# Patient Record
Sex: MALE | Race: WHITE | Employment: FULL TIME | URBAN - METROPOLITAN AREA
[De-identification: names, ages, dates, MRNs, and addresses within clinical notes are randomized per-mention and may not be internally consistent; named-entity substitution may affect disease eponyms.]

---

## 2017-04-26 DIAGNOSIS — E78.5 HYPERLIPIDEMIA LDL GOAL <100: ICD-10-CM

## 2017-04-26 RX ORDER — ATORVASTATIN CALCIUM 80 MG/1
80 TABLET, FILM COATED ORAL AT BEDTIME
Qty: 90 TABLET | Refills: 0 | Status: SHIPPED | OUTPATIENT
Start: 2017-04-26 | End: 2017-05-23

## 2017-04-26 NOTE — TELEPHONE ENCOUNTER
LOV 2/17/16. No future OV scheduled. RN refilled rx for dispense #90 with no refills to allow patient sufficient amount of medication to schedule apt with Dr. Perez for further refills.

## 2017-05-16 ENCOUNTER — TELEPHONE (OUTPATIENT)
Dept: CARDIOLOGY | Facility: CLINIC | Age: 47
End: 2017-05-16

## 2017-05-16 NOTE — TELEPHONE ENCOUNTER
----- Message from Alondra Perez, RN sent at 5/16/2017 11:00 AM CDT -----  Regarding: Fasting labs  Gurdeep Robbins and Essence,  Please place orders for Lipid/ALT/BMP  Dx: CAD/HLD/HTN  Call pt to schedule prior to 5/23/17 revisit.  Thanks,  Fadumo

## 2017-05-17 DIAGNOSIS — E78.5 HYPERLIPIDEMIA LDL GOAL <70: Primary | Chronic | ICD-10-CM

## 2017-05-17 DIAGNOSIS — E78.5 HYPERLIPIDEMIA LDL GOAL <70: Chronic | ICD-10-CM

## 2017-05-17 DIAGNOSIS — I25.10 CORONARY ARTERY DISEASE INVOLVING NATIVE CORONARY ARTERY OF NATIVE HEART WITHOUT ANGINA PECTORIS: Chronic | ICD-10-CM

## 2017-05-17 LAB
ALT SERPL W P-5'-P-CCNC: 47 U/L (ref 0–70)
ANION GAP SERPL CALCULATED.3IONS-SCNC: 9 MMOL/L (ref 3–14)
BUN SERPL-MCNC: 12 MG/DL (ref 7–30)
CALCIUM SERPL-MCNC: 8.9 MG/DL (ref 8.5–10.1)
CHLORIDE SERPL-SCNC: 105 MMOL/L (ref 94–109)
CHOLEST SERPL-MCNC: 126 MG/DL
CO2 SERPL-SCNC: 28 MMOL/L (ref 20–32)
CREAT SERPL-MCNC: 1.13 MG/DL (ref 0.66–1.25)
GFR SERPL CREATININE-BSD FRML MDRD: 69 ML/MIN/1.7M2
GLUCOSE SERPL-MCNC: 94 MG/DL (ref 70–99)
HDLC SERPL-MCNC: 31 MG/DL
LDLC SERPL CALC-MCNC: 56 MG/DL
NONHDLC SERPL-MCNC: 95 MG/DL
POTASSIUM SERPL-SCNC: 3.8 MMOL/L (ref 3.4–5.3)
SODIUM SERPL-SCNC: 142 MMOL/L (ref 133–144)
TRIGL SERPL-MCNC: 195 MG/DL

## 2017-05-17 PROCEDURE — 80048 BASIC METABOLIC PNL TOTAL CA: CPT | Performed by: INTERNAL MEDICINE

## 2017-05-17 PROCEDURE — 80061 LIPID PANEL: CPT | Performed by: INTERNAL MEDICINE

## 2017-05-17 PROCEDURE — 84460 ALANINE AMINO (ALT) (SGPT): CPT | Performed by: INTERNAL MEDICINE

## 2017-05-17 PROCEDURE — 36415 COLL VENOUS BLD VENIPUNCTURE: CPT | Performed by: INTERNAL MEDICINE

## 2017-05-23 ENCOUNTER — OFFICE VISIT (OUTPATIENT)
Dept: CARDIOLOGY | Facility: CLINIC | Age: 47
End: 2017-05-23
Payer: COMMERCIAL

## 2017-05-23 VITALS
WEIGHT: 226 LBS | SYSTOLIC BLOOD PRESSURE: 119 MMHG | OXYGEN SATURATION: 96 % | BODY MASS INDEX: 33.37 KG/M2 | HEART RATE: 60 BPM | DIASTOLIC BLOOD PRESSURE: 79 MMHG

## 2017-05-23 DIAGNOSIS — I10 HYPERTENSION GOAL BP (BLOOD PRESSURE) < 140/90: ICD-10-CM

## 2017-05-23 DIAGNOSIS — I25.10 CORONARY ARTERY DISEASE INVOLVING NATIVE CORONARY ARTERY OF NATIVE HEART WITHOUT ANGINA PECTORIS: Primary | Chronic | ICD-10-CM

## 2017-05-23 DIAGNOSIS — E78.5 HYPERLIPIDEMIA LDL GOAL <70: Chronic | ICD-10-CM

## 2017-05-23 DIAGNOSIS — E78.5 HYPERLIPIDEMIA LDL GOAL <100: ICD-10-CM

## 2017-05-23 PROBLEM — F98.8 ADD (ATTENTION DEFICIT DISORDER): Status: ACTIVE | Noted: 2017-05-23

## 2017-05-23 PROCEDURE — 99214 OFFICE O/P EST MOD 30 MIN: CPT | Performed by: INTERNAL MEDICINE

## 2017-05-23 RX ORDER — ATORVASTATIN CALCIUM 80 MG/1
80 TABLET, FILM COATED ORAL AT BEDTIME
Qty: 90 TABLET | Refills: 3 | Status: SHIPPED | OUTPATIENT
Start: 2017-05-23 | End: 2017-05-23

## 2017-05-23 RX ORDER — METOPROLOL SUCCINATE 50 MG/1
50 TABLET, EXTENDED RELEASE ORAL DAILY
Qty: 90 TABLET | Refills: 3 | Status: SHIPPED | OUTPATIENT
Start: 2017-05-23 | End: 2017-05-23

## 2017-05-23 RX ORDER — ATORVASTATIN CALCIUM 80 MG/1
80 TABLET, FILM COATED ORAL AT BEDTIME
Qty: 90 TABLET | Refills: 3 | Status: SHIPPED | OUTPATIENT
Start: 2017-05-23 | End: 2017-05-24

## 2017-05-23 RX ORDER — METOPROLOL SUCCINATE 50 MG/1
50 TABLET, EXTENDED RELEASE ORAL DAILY
Qty: 90 TABLET | Refills: 3 | Status: SHIPPED | OUTPATIENT
Start: 2017-05-23 | End: 2017-05-24

## 2017-05-23 NOTE — LETTER
5/23/2017    Lima Meza, DO  [address] [place] [address]    RE: Lamonte Adamdeb       Dear Colleague,    I had the pleasure of seeing Lamonte Sarkar in the [facility].    Mr. Sarkar is a 47-year-old gentleman with a history of coronary artery disease, status post PCI to the LAD in 2007 following a presentation for an anterior myocardial infarction.  He has been left with a small apical scar but preserved systolic function.  He returns in 15-month followup after being late for his annual followup.  He needs to refill his cardiac medications.      The patient has been feeling well from a cardiovascular standpoint, denies any chest pain, pressure, shortness of breath, orthopnea or paroxysmal nocturnal dyspnea.      He was recently diagnosed with ADHD, and it was recommended that the patient be placed on drugs related to his ADHD.  For unclear reasons, the doctor that made the diagnosis recommended that the patient request the drugs from his primary care doctor or cardiologist.      His most recent lipids were from this month and showed a total cholesterol of 126, HDL 31, LDL 56 and triglycerides of 195.      Please see my separate note with his full physical examination.     Outpatient Encounter Prescriptions as of 5/23/2017   Medication Sig Dispense Refill     [DISCONTINUED] atorvastatin (LIPITOR) 80 MG tablet Take 1 tablet (80 mg) by mouth At Bedtime 90 tablet 3     [DISCONTINUED] metoprolol (TOPROL-XL) 50 MG 24 hr tablet Take 1 tablet (50 mg) by mouth daily 90 tablet 3     nitroglycerin (NITROSTAT) 0.4 MG SL tablet Place 1 tablet (0.4 mg) under the tongue every 5 minutes as needed for chest pain (if more than 1 is used, seek immediate medical attention) for chest pain 25 tablet 3     order for DME CPAP 10 cm       Coenzyme Q10 (CO Q 10) 100 MG CAPS Take 200 mg by mouth       aspirin EC 81 MG tablet Take 1 tablet (81 mg) by mouth daily 30 tablet 6     FISH OIL         [DISCONTINUED] atorvastatin (LIPITOR) 80 MG tablet Take 1 tablet (80 mg) by mouth At Bedtime 90 tablet 3     [DISCONTINUED] metoprolol (TOPROL-XL) 50 MG 24 hr tablet Take 1 tablet (50 mg) by mouth daily 90 tablet 3     [DISCONTINUED] atorvastatin (LIPITOR) 80 MG tablet Take 1 tablet (80 mg) by mouth At Bedtime 90 tablet 0     [DISCONTINUED] metoprolol (TOPROL-XL) 50 MG 24 hr tablet Take 1 tablet (50 mg) by mouth daily 90 tablet 3     No facility-administered encounter medications on file as of 5/23/2017.       IMPRESSION AND PLAN:   1.  Coronary artery disease -- Mr. Sarkar is clinically stable with no anginal symptoms at the present time.  I would recommend aspirin 81 mg a day, atorvastatin 80 mg daily and metoprolol unchanged at the present time for secondary prevention.  When we went to arrange followup for the patient, he was somewhat upset at our inability to prescribe ADHD drugs for him.  I instructed the patient that I could not prescribe his ADHD drugs, as that falls out of my purview of type of practice.  I would be happy to comment on the relative stability of his coronary disease but again would have to defer to his primary doctor or psychiatrist the actual prescribing of his ADHD drugs.  The patient, therefore, related to me that he is likely going to seek a primary doctor and cardiologist through a different system.  I have renewed his medications for 1 year, which should enable him plenty of time to find a new cardiologist.  Again, from a cardiovascular standpoint, he is completely stable and at this point will follow up with us on a p.r.n. basis.     Again, thank you for allowing me to participate in the care of your patient.      Sincerely,    Mahesh Perez MD     [facility]

## 2017-05-23 NOTE — PROGRESS NOTES
HPI and Plan:   See dictation    No orders of the defined types were placed in this encounter.      Orders Placed This Encounter   Medications     atorvastatin (LIPITOR) 80 MG tablet     Sig: Take 1 tablet (80 mg) by mouth At Bedtime     Dispense:  90 tablet     Refill:  3     metoprolol (TOPROL-XL) 50 MG 24 hr tablet     Sig: Take 1 tablet (50 mg) by mouth daily     Dispense:  90 tablet     Refill:  3       Medications Discontinued During This Encounter   Medication Reason     atorvastatin (LIPITOR) 80 MG tablet Reorder     metoprolol (TOPROL-XL) 50 MG 24 hr tablet Reorder         Encounter Diagnoses   Name Primary?     Coronary artery disease involving native coronary artery of native heart without angina pectoris Yes     Hyperlipidemia LDL goal <70      Hyperlipidemia LDL goal <100      Hypertension goal BP (blood pressure) < 140/90        CURRENT MEDICATIONS:  Current Outpatient Prescriptions   Medication Sig Dispense Refill     atorvastatin (LIPITOR) 80 MG tablet Take 1 tablet (80 mg) by mouth At Bedtime 90 tablet 3     metoprolol (TOPROL-XL) 50 MG 24 hr tablet Take 1 tablet (50 mg) by mouth daily 90 tablet 3     nitroglycerin (NITROSTAT) 0.4 MG SL tablet Place 1 tablet (0.4 mg) under the tongue every 5 minutes as needed for chest pain (if more than 1 is used, seek immediate medical attention) for chest pain 25 tablet 3     order for DME CPAP 10 cm       Coenzyme Q10 (CO Q 10) 100 MG CAPS Take 200 mg by mouth       aspirin EC 81 MG tablet Take 1 tablet (81 mg) by mouth daily 30 tablet 6     FISH OIL        [DISCONTINUED] atorvastatin (LIPITOR) 80 MG tablet Take 1 tablet (80 mg) by mouth At Bedtime 90 tablet 0     [DISCONTINUED] metoprolol (TOPROL-XL) 50 MG 24 hr tablet Take 1 tablet (50 mg) by mouth daily 90 tablet 3       ALLERGIES   No Known Allergies    PAST MEDICAL HISTORY:  Past Medical History:   Diagnosis Date     Myocardial infarction (H) 2007    with occluded LAD lesion       PAST SURGICAL  HISTORY:  Past Surgical History:   Procedure Laterality Date     SURGICAL HISTORY OF -   1980    Dental surgery - no complications on tooth extraction x3     SURGICAL HISTORY OF -   2004    RIGHT achilles tendon repair       FAMILY HISTORY:  Family History   Problem Relation Age of Onset     CANCER Father      unknown type     Breast Cancer Mother      age of onset:  65     CANCER Mother      lung cancer     Respiratory Mother      asthma     DIABETES Maternal Grandmother      DIABETES Maternal Grandfather      DIABETES Paternal Grandfather      CEREBROVASCULAR DISEASE Paternal Grandfather      Asthma No family hx of      Hypertension No family hx of      Cancer - colorectal Father 55     Prostate Cancer No family hx of      HEART DISEASE Other      HEART DISEASE Other        SOCIAL HISTORY:  Social History     Social History     Marital status:      Spouse name: N/A     Number of children: N/A     Years of education: N/A     Occupational History           Social History Main Topics     Smoking status: Former Smoker     Years: 2.00     Quit date: 5/19/1986     Smokeless tobacco: None     Alcohol use 0.0 - 0.6 oz/week     0 - 1 Standard drinks or equivalent per week      Comment: on occassion     Drug use: No     Sexual activity: Yes     Partners: Female     Other Topics Concern     Parent/Sibling W/ Cabg, Mi Or Angioplasty Before 65f 55m? No     Social History Narrative       Review of Systems:  Skin:  Negative       Eyes:  Positive for glasses    ENT:  Negative      Respiratory:  Negative for dyspnea on exertion;shortness of breath     Cardiovascular:  Negative for;palpitations;chest pain;edema;syncope or near-syncope;fatigue;lightheadedness;dizziness      Gastroenterology: Positive for heartburn    Genitourinary:  Negative      Musculoskeletal:  Positive for joint stiffness    Neurologic:  Negative      Psychiatric:  Positive for anxiety;depression    Heme/Lymph/Imm:  Negative      Endocrine:   Negative        Physical Exam:  Vitals: /79  Pulse 60  Wt 102.5 kg (226 lb)  SpO2 96%  BMI 33.37 kg/m2    Constitutional:  in no acute distress        Skin:  warm and dry to the touch        Head:  normocephalic        Eyes:  sclera white        ENT:  no pallor or cyanosis        Neck:  no stiffness        Chest:  clear to auscultation          Cardiac: regular rhythm;normal S1 and S2                  Abdomen:  abdomen soft        Vascular:                                     Radial pulses are equal and normal    Extremities and Back:  no edema              Neurological:  affect appropriate              CC  No referring provider defined for this encounter.

## 2017-05-23 NOTE — MR AVS SNAPSHOT
After Visit Summary   5/23/2017    Lamonte Sarkar    MRN: [id number]           Patient Information     Date Of Birth          1970        Visit Information        Provider Department      5/23/2017 10:30 AM Mahesh Perez MD [facility] AT [facility]        Today's Diagnoses     Coronary artery disease involving native coronary artery of native heart without angina pectoris    -  1    Hyperlipidemia LDL goal <70        Hyperlipidemia LDL goal <100        Hypertension goal BP (blood pressure) < 140/90           Follow-ups after your visit        Additional Services     Follow-Up with Cardiologist                 Future tests that were ordered for you today     Open Future Orders        Priority Expected Expires Ordered    Follow-Up with Cardiologist Routine 5/23/2019 6/12/2019 5/23/2017            Who to contact     If you have questions or need follow up information about today's clinic visit or your schedule please contact [facility] AT [facility] directly at [phone].  Normal or non-critical lab and imaging results will be communicated to you by [facility], letter or phone within 4 business days after the clinic has received the results. If you do not hear from us within 7 days, please contact the clinic through [facility] or phone. If you have a critical or abnormal lab result, we will notify you by phone as soon as possible.  Submit refill requests through [employer] or call your pharmacy and they will forward the refill request to us. Please allow 3 business days for your refill to be completed.          Additional Information About Your Visit        [facility] Information     [employer] gives you secure access to your electronic health record. If you see a primary care provider, you can also send messages to your care team and make appointments. If you have questions, please call your primary care clinic.  If you do not have a primary  care provider, please call [phone] and they will assist you.        Care EveryWhere ID     This is your Care EveryWhere ID. This could be used by other organizations to access your [place] medical records  [id number]        Your Vitals Were     Pulse Pulse Oximetry BMI (Body Mass Index)             60 96% 33.37 kg/m2          Blood Pressure from Last 3 Encounters:   05/23/17 119/79   05/19/16 134/79   05/09/16 120/76    Weight from Last 3 Encounters:   05/23/17 102.5 kg (226 lb)   05/19/16 96.6 kg (213 lb)   05/09/16 96.1 kg (211 lb 12.8 oz)                 Where to get your medicines      Some of these will need a paper prescription and others can be bought over the counter.  Ask your nurse if you have questions.     Bring a paper prescription for each of these medications     atorvastatin 80 MG tablet    metoprolol 50 MG 24 hr tablet          Primary Care Provider Office Phone # Fax #    Lima Essence Meza [phone]       [address]   [facility] [address]        Thank you!     Thank you for choosing [facility] AT [facility]  for your care. Our goal is always to provide you with excellent care. Hearing back from our patients is one way we can continue to improve our services. Please take a few minutes to complete the written survey that you may receive in the mail after your visit with us. Thank you!             Your Updated Medication List - Protect others around you: Learn how to safely use, store and throw away your medicines at www.disposemymeds.org.          This list is accurate as of: 5/23/17 10:43 AM.  Always use your most recent med list.                   Brand Name Dispense Instructions for use    aspirin EC 81 MG EC tablet     30 tablet    Take 1 tablet (81 mg) by mouth daily       atorvastatin 80 MG tablet    LIPITOR    90 tablet    Take 1 tablet (80 mg) by mouth At Bedtime       Co Q 10 100 MG Caps      Take 200 mg by  mouth       FISH OIL          metoprolol 50 MG 24 hr tablet    TOPROL-XL    90 tablet    Take 1 tablet (50 mg) by mouth daily       nitroglycerin 0.4 MG sublingual tablet    NITROSTAT    25 tablet    Place 1 tablet (0.4 mg) under the tongue every 5 minutes as needed for chest pain (if more than 1 is used, seek immediate medical attention) for chest pain       order for DME      CPAP 10 cm

## 2017-05-23 NOTE — PROGRESS NOTES
Patient states is a strict vegetarian no dairy or meet, strictly fruits vegetables, nuts and grains.  New diagnosis of ADD [facility], Dr. Son does not want to prescribe medication due to current heart condiotions would like primary or cardiologist to make the decision.

## 2017-05-24 DIAGNOSIS — E78.5 HYPERLIPIDEMIA LDL GOAL <100: ICD-10-CM

## 2017-05-24 DIAGNOSIS — I25.10 CORONARY ARTERY DISEASE INVOLVING NATIVE CORONARY ARTERY OF NATIVE HEART WITHOUT ANGINA PECTORIS: Chronic | ICD-10-CM

## 2017-05-24 DIAGNOSIS — I10 HYPERTENSION GOAL BP (BLOOD PRESSURE) < 140/90: ICD-10-CM

## 2017-05-24 RX ORDER — ATORVASTATIN CALCIUM 80 MG/1
80 TABLET, FILM COATED ORAL AT BEDTIME
Qty: 90 TABLET | Refills: 3 | Status: SHIPPED | OUTPATIENT
Start: 2017-05-24

## 2017-05-24 RX ORDER — METOPROLOL SUCCINATE 50 MG/1
50 TABLET, EXTENDED RELEASE ORAL DAILY
Qty: 90 TABLET | Refills: 3 | Status: SHIPPED | OUTPATIENT
Start: 2017-05-24

## 2017-05-24 NOTE — PROGRESS NOTES
HISTORY OF PRESENT ILLNESS:  Mr. Sarkar is a 47-year-old gentleman with a history of coronary artery disease, status post PCI to the LAD in 2007 following a presentation for an anterior myocardial infarction.  He has been left with a small apical scar but preserved systolic function.  He returns in 15-month followup after being late for his annual followup.  He needs to refill his cardiac medications.      The patient has been feeling well from a cardiovascular standpoint, denies any chest pain, pressure, shortness of breath, orthopnea or paroxysmal nocturnal dyspnea.      He was recently diagnosed with ADHD, and it was recommended that the patient be placed on drugs related to his ADHD.  For unclear reasons, the doctor that made the diagnosis recommended that the patient request the drugs from his primary care doctor or cardiologist.      His most recent lipids were from this month and showed a total cholesterol of 126, HDL 31, LDL 56 and triglycerides of 195.      Please see my separate note with his full physical examination.      IMPRESSION AND PLAN:   1.  Coronary artery disease -- Mr. Sarkar is clinically stable with no anginal symptoms at the present time.  I would recommend aspirin 81 mg a day, atorvastatin 80 mg daily and metoprolol unchanged at the present time for secondary prevention.  When we went to arrange followup for the patient, he was somewhat upset at our inability to prescribe ADHD drugs for him.  I instructed the patient that I could not prescribe his ADHD drugs, as that falls out of my purview of type of practice.  I would be happy to comment on the relative stability of his coronary disease but again would have to defer to his primary doctor or psychiatrist the actual prescribing of his ADHD drugs.  The patient, therefore, related to me that he is likely going to seek a primary doctor and cardiologist through a different system.  I have renewed his medications for 1 year, which should  enable him plenty of time to find a new cardiologist.  Again, from a cardiovascular standpoint, he is completely stable and at this point will follow up with us on a p.r.n. basis.         JIM PARKINSON MD             D: 2017 10:51   T: 2017 22:14   MT: SHAE      Name:     INÉS ARIZA   MRN:      [id number]        Account:      [id number]   :      1970           Service Date: 2017      Document: [id number]

## 2017-05-26 DIAGNOSIS — G47.33 OBSTRUCTIVE SLEEP APNEA: Primary | Chronic | ICD-10-CM

## 2018-09-18 ENCOUNTER — TELEPHONE (OUTPATIENT)
Dept: OTHER | Facility: CLINIC | Age: 48
End: 2018-09-18

## 2019-11-06 ENCOUNTER — HEALTH MAINTENANCE LETTER (OUTPATIENT)
Age: 49
End: 2019-11-06

## 2020-11-29 ENCOUNTER — HEALTH MAINTENANCE LETTER (OUTPATIENT)
Age: 50
End: 2020-11-29

## 2021-04-23 ENCOUNTER — IMMUNIZATION (OUTPATIENT)
Dept: FAMILY MEDICINE | Facility: CLINIC | Age: 51
End: 2021-04-23
Payer: OTHER GOVERNMENT

## 2021-04-23 PROCEDURE — 91301 PR COVID VAC MODERNA 100 MCG/0.5 ML IM: CPT

## 2021-04-23 PROCEDURE — 0011A PR COVID VAC MODERNA 100 MCG/0.5 ML IM: CPT

## 2021-05-21 ENCOUNTER — IMMUNIZATION (OUTPATIENT)
Dept: FAMILY MEDICINE | Facility: CLINIC | Age: 51
End: 2021-05-21
Attending: FAMILY MEDICINE
Payer: OTHER GOVERNMENT

## 2021-05-21 PROCEDURE — 91301 PR COVID VAC MODERNA 100 MCG/0.5 ML IM: CPT

## 2021-05-21 PROCEDURE — 0012A PR COVID VAC MODERNA 100 MCG/0.5 ML IM: CPT

## 2021-09-19 ENCOUNTER — HEALTH MAINTENANCE LETTER (OUTPATIENT)
Age: 51
End: 2021-09-19

## 2021-10-19 ENCOUNTER — APPOINTMENT (OUTPATIENT)
Dept: URBAN - METROPOLITAN AREA CLINIC 252 | Age: 51
Setting detail: DERMATOLOGY
End: 2021-10-19

## 2021-10-19 VITALS — RESPIRATION RATE: 18 BRPM | WEIGHT: 187 LBS | HEIGHT: 68 IN

## 2021-10-19 DIAGNOSIS — L0293 CARBUNCLE AND FURUNCLE OF UNSPECIFIED SITE: ICD-10-CM

## 2021-10-19 DIAGNOSIS — L0292 CARBUNCLE AND FURUNCLE OF UNSPECIFIED SITE: ICD-10-CM

## 2021-10-19 PROBLEM — L02.423 FURUNCLE OF RIGHT UPPER LIMB: Status: ACTIVE | Noted: 2021-10-19

## 2021-10-19 PROCEDURE — OTHER INTRALESIONAL KENALOG: OTHER

## 2021-10-19 PROCEDURE — OTHER COUNSELING: OTHER

## 2021-10-19 PROCEDURE — 99202 OFFICE O/P NEW SF 15 MIN: CPT | Mod: 25

## 2021-10-19 ASSESSMENT — LOCATION DETAILED DESCRIPTION DERM: LOCATION DETAILED: RIGHT ANTERIOR DISTAL UPPER ARM

## 2021-10-19 ASSESSMENT — LOCATION SIMPLE DESCRIPTION DERM: LOCATION SIMPLE: RIGHT UPPER ARM

## 2021-10-19 ASSESSMENT — LOCATION ZONE DERM: LOCATION ZONE: ARM

## 2021-10-19 NOTE — PROCEDURE: COUNSELING
Patient Specific Counseling (Will Not Stick From Patient To Patient): -Discussed the suspected diagnosis of a resolving infected hair follicle called a furuncle; we can biopsy the lesion to confirm the diagnosis today, however there will be scar post biopsy and may not be necessary. Skin cancer such as basal cell is always within the differential therefore if it changes in appearance or worsens, he should RTC we should reevaluate. \\n-Patient inquired about risks for mupirocin causing cancer, reassured that skin cancer is not on the warning label, this topical is benign and safe to use. Today it does not appear inflected, therefore a topical isn’t necessary at this point. \\n-allow time for the redness to fade, can take a months time or so. Recommend taking photos to monitor progress. \\n-Offered the option of a kenalog iL injection as that can expedite the progress of healing. Patient expressing interest in this option
Detail Level: Detailed

## 2021-11-14 ENCOUNTER — HEALTH MAINTENANCE LETTER (OUTPATIENT)
Age: 51
End: 2021-11-14

## 2022-01-09 ENCOUNTER — HEALTH MAINTENANCE LETTER (OUTPATIENT)
Age: 52
End: 2022-01-09

## 2022-11-21 ENCOUNTER — HEALTH MAINTENANCE LETTER (OUTPATIENT)
Age: 52
End: 2022-11-21

## 2023-04-16 ENCOUNTER — HEALTH MAINTENANCE LETTER (OUTPATIENT)
Age: 53
End: 2023-04-16

## 2024-06-23 ENCOUNTER — HEALTH MAINTENANCE LETTER (OUTPATIENT)
Age: 54
End: 2024-06-23

## 2025-01-02 SDOH — HEALTH STABILITY: PHYSICAL HEALTH: ON AVERAGE, HOW MANY DAYS PER WEEK DO YOU ENGAGE IN MODERATE TO STRENUOUS EXERCISE (LIKE A BRISK WALK)?: 6 DAYS

## 2025-01-02 SDOH — HEALTH STABILITY: PHYSICAL HEALTH: ON AVERAGE, HOW MANY MINUTES DO YOU ENGAGE IN EXERCISE AT THIS LEVEL?: 60 MIN

## 2025-01-02 ASSESSMENT — SOCIAL DETERMINANTS OF HEALTH (SDOH): HOW OFTEN DO YOU GET TOGETHER WITH FRIENDS OR RELATIVES?: PATIENT DECLINED

## 2025-01-07 ENCOUNTER — OFFICE VISIT (OUTPATIENT)
Dept: FAMILY MEDICINE | Facility: CLINIC | Age: 55
End: 2025-01-07
Payer: COMMERCIAL

## 2025-01-07 VITALS
WEIGHT: 159.2 LBS | TEMPERATURE: 97.7 F | HEART RATE: 71 BPM | OXYGEN SATURATION: 99 % | BODY MASS INDEX: 23.58 KG/M2 | DIASTOLIC BLOOD PRESSURE: 80 MMHG | HEIGHT: 69 IN | SYSTOLIC BLOOD PRESSURE: 122 MMHG | RESPIRATION RATE: 20 BRPM

## 2025-01-07 DIAGNOSIS — Z13.1 SCREENING FOR DIABETES MELLITUS: ICD-10-CM

## 2025-01-07 DIAGNOSIS — Z13.29 SCREENING FOR THYROID DISORDER: ICD-10-CM

## 2025-01-07 DIAGNOSIS — Z12.11 SPECIAL SCREENING FOR MALIGNANT NEOPLASMS, COLON: ICD-10-CM

## 2025-01-07 DIAGNOSIS — Z13.220 SCREENING CHOLESTEROL LEVEL: ICD-10-CM

## 2025-01-07 DIAGNOSIS — Z00.00 ROUTINE GENERAL MEDICAL EXAMINATION AT A HEALTH CARE FACILITY: Primary | ICD-10-CM

## 2025-01-07 DIAGNOSIS — Z11.4 SCREENING FOR HIV (HUMAN IMMUNODEFICIENCY VIRUS): ICD-10-CM

## 2025-01-07 DIAGNOSIS — Z80.0 FAMILY HISTORY OF COLON CANCER: ICD-10-CM

## 2025-01-07 DIAGNOSIS — R19.5 POSITIVE COLORECTAL CANCER SCREENING USING COLOGUARD TEST: ICD-10-CM

## 2025-01-07 DIAGNOSIS — Z11.59 NEED FOR HEPATITIS C SCREENING TEST: ICD-10-CM

## 2025-01-07 DIAGNOSIS — Z78.9 KETOGENIC DIET: ICD-10-CM

## 2025-01-07 DIAGNOSIS — Z12.5 SCREENING FOR PROSTATE CANCER: ICD-10-CM

## 2025-01-07 PROCEDURE — 87389 HIV-1 AG W/HIV-1&-2 AB AG IA: CPT | Performed by: NURSE PRACTITIONER

## 2025-01-07 PROCEDURE — 80061 LIPID PANEL: CPT | Performed by: NURSE PRACTITIONER

## 2025-01-07 PROCEDURE — 99386 PREV VISIT NEW AGE 40-64: CPT | Performed by: NURSE PRACTITIONER

## 2025-01-07 PROCEDURE — 36415 COLL VENOUS BLD VENIPUNCTURE: CPT | Performed by: NURSE PRACTITIONER

## 2025-01-07 PROCEDURE — 84443 ASSAY THYROID STIM HORMONE: CPT | Performed by: NURSE PRACTITIONER

## 2025-01-07 PROCEDURE — 86803 HEPATITIS C AB TEST: CPT | Performed by: NURSE PRACTITIONER

## 2025-01-07 PROCEDURE — G0103 PSA SCREENING: HCPCS | Performed by: NURSE PRACTITIONER

## 2025-01-07 PROCEDURE — 82947 ASSAY GLUCOSE BLOOD QUANT: CPT | Performed by: NURSE PRACTITIONER

## 2025-01-07 NOTE — PROGRESS NOTES
Preventive Care Visit  [facility] GURPREET GUERRA, Family Medicine  Jan 7, 2025      Assessment & Plan     Routine general medical examination at a health care facility      Screening for diabetes mellitus    - Glucose; Future  - Glucose    Screening for prostate cancer    - PSA, screen; Future  - PSA, screen    Screening for thyroid disorder    - TSH with free T4 reflex; Future  - TSH with free T4 reflex    Special screening for malignant neoplasms, colon    - Colonoscopy Screening  Referral; Future    Positive FIT (fecal immunochemical test)      Family history of colon cancer    - Colonoscopy Screening  Referral; Future    Positive colorectal cancer screening using Cologuard test    - Colonoscopy Screening  Referral; Future    Screening cholesterol level    - Lipid panel reflex to direct LDL Non-fasting; Future  - Lipid panel reflex to direct LDL Non-fasting    Screening for HIV (human immunodeficiency virus)    - HIV Antigen Antibody Combo; Future  - HIV Antigen Antibody Combo    Need for hepatitis C screening test    - Hepatitis C Screen Reflex to HCV RNA Quant and Genotype; Future  - Hepatitis C Screen Reflex to HCV RNA Quant and Genotype    Ketogenic diet      Patient has been advised of split billing requirements and indicates understanding: Yes        Counseling  Appropriate preventive services were addressed with this patient via screening, questionnaire, or discussion as appropriate for fall prevention, nutrition, physical activity, Tobacco-use cessation, social engagement, weight loss and cognition.  Checklist reviewing preventive services available has been given to the patient.  Reviewed patient's diet, addressing concerns and/or questions.       Regular exercise  See Patient Instructions    Sondra   Lamonte is a 54 year old, presenting for the following:  Physical    He reports his mental health is good.  He is retired.  He lost  significant weight with ketogenic diet and walking and no longer needs blood pressure or cholesterol medication.  2 adult children, gets along well with wife.  Quit smoking over 20 years ago.  In agreement to screening labs.  Declining vaccines.  Would like colonoscopy ordered.  Did have positive Cologuard last year, family history of colon cancer.        1/7/2025    10:07 AM   Additional Questions   Roomed by CHUNG         1/7/2025    10:07 AM   Patient Reported Additional Medications   Patient reports taking the following new medications Medications missing from list-provider please review and add          HPI    Patient would still like to discuss the following concern(s):  1. Colonoscopy order            1/2/2025   General Health   How would you rate your overall physical health? Good   Feel stress (tense, anxious, or unable to sleep) Only a little   (!) STRESS CONCERN      1/2/2025   Nutrition   Three or more servings of calcium each day? (!) I DON'T KNOW   Diet: Carbohydrate counting    Other   If other, please elaborate: Low Sugar diet that includes Healthy fats and Protein.  Regular Intermittent fasting.   How many servings of fruit and vegetables per day? (!) 2-3   How many sweetened beverages each day? 0-1       Multiple values from one day are sorted in reverse-chronological order         1/2/2025   Exercise   Days per week of moderate/strenous exercise 6 days   Average minutes spent exercising at this level 60 min         1/2/2025   Social Factors   Frequency of gathering with friends or relatives Patient declined   Worry food won't last until get money to buy more No   Food not last or not have enough money for food? No   Do you have housing? (Housing is defined as stable permanent housing and does not include staying ouside in a car, in a tent, in an abandoned building, in an overnight shelter, or couch-surfing.) Yes   Are you worried about losing your housing? No   Lack of transportation? No   Unable to  get utilities (heat,electricity)? No         2025   Fall Risk   Fallen 2 or more times in the past year? No   Trouble with walking or balance? No          2025   Dental   Dentist two times every year? Yes         2025   TB Screening   Were you born outside of the [place]? No         Today's PHQ-2 Score:       2025    12:52 PM   PHQ-2 (  [employer])   Q1: Little interest or pleasure in doing things 0   Q2: Feeling down, depressed or hopeless 0   PHQ-2 Score 0    Q1: Little interest or pleasure in doing things Not at all   Q2: Feeling down, depressed or hopeless Not at all   PHQ-2 Score 0       Patient-reported           2025   Substance Use   Alcohol more than 3/day or more than 7/wk Not Applicable   Do you use any other substances recreationally? No     Social History     Tobacco Use    Smoking status: Former     Current packs/day: 0.00     Types: Cigarettes     Start date: 1984     Quit date: 1986     Years since quittin.6     Passive exposure: Past    Smokeless tobacco: Never    Tobacco comments:     Light snuff chewer when young   Vaping Use    Vaping status: Never Used   Substance Use Topics    Alcohol use: Not Currently     Alcohol/week: 0.0 - 1.0 standard drinks of alcohol     Comment: on occassion    Drug use: No             2025   One time HIV Screening   Previous HIV test? No         2025   STI Screening   New sexual partner(s) since last STI/HIV test? No   Last PSA:   PSA   Date Value Ref Range Status   2016 1.02 0 - 4 ug/L Final     ASCVD Risk   The ASCVD Risk score (Preston BILLS, et al., 2019) failed to calculate for the following reasons:    Cannot find a previous HDL lab    Cannot find a previous total cholesterol lab           Reviewed and updated as needed this visit by Provider   Tobacco  Allergies  Meds  Problems  Med Hx  Surg Hx  Fam Hx  Soc   Hx Sexual Activity          Past Medical History:   Diagnosis Date    Myocardial infarction (H)      with occluded LAD lesion     Past Surgical History:   Procedure Laterality Date    CARDIAC SURGERY  2007    Stents    SURGICAL HISTORY OF -   1980    Dental surgery - no complications on tooth extraction x3    SURGICAL HISTORY OF -   2004    RIGHT achilles tendon repair     Lab work is in process  Labs reviewed in EPIC  BP Readings from Last 3 Encounters:   25 122/80   17 119/79   16 134/79    Wt Readings from Last 3 Encounters:   25 72.2 kg (159 lb 3.2 oz)   17 102.5 kg (226 lb)   16 96.6 kg (213 lb)                  Patient Active Problem List   Diagnosis    Obstructive sleep apnea- severe (AHI 31)    CAD (coronary artery disease)    Family History of Colon Cancer father at 55    ADD (attention deficit disorder)    Positive colorectal cancer screening using Cologuard test    Ketogenic diet     Past Surgical History:   Procedure Laterality Date    CARDIAC SURGERY  2007    Stents    SURGICAL HISTORY OF -   1980    Dental surgery - no complications on tooth extraction x3    SURGICAL HISTORY OF -   2004    RIGHT achilles tendon repair       Social History     Tobacco Use    Smoking status: Former     Current packs/day: 0.00     Types: Cigarettes     Start date: 1984     Quit date: 1986     Years since quittin.6     Passive exposure: Past    Smokeless tobacco: Never    Tobacco comments:     Light snuff chewer when young   Substance Use Topics    Alcohol use: Not Currently     Alcohol/week: 0.0 - 1.0 standard drinks of alcohol     Comment: on occassion     Family History   Problem Relation Age of Onset    Heart Disease Other     Heart Disease Other     Cancer Father         unknown type    Cancer - colorectal Father 55    Breast Cancer Mother         age of onset:  65    Cancer Mother         lung cancer    Respiratory Mother         asthma    Diabetes Maternal Grandmother     Diabetes Maternal Grandfather     Diabetes Paternal  "Grandfather     Cerebrovascular Disease Paternal Grandfather     Asthma No family hx of     Hypertension No family hx of     Prostate Cancer No family hx of          Current Outpatient Medications   Medication Sig Dispense Refill    Coenzyme Q10 (CO Q 10) 100 MG CAPS Take 200 mg by mouth      FISH OIL       order for DME CPAP 10 cm       No Known Allergies  Recent Labs   Lab Test 05/17/17  1024   LDL 56   HDL 31*   TRIG 195*   ALT 47   CR 1.13   GFRESTIMATED 69   GFRESTBLACK 83   POTASSIUM 3.8          Review of Systems  CONSTITUTIONAL: NEGATIVE for fever, chills, change in weight  INTEGUMENTARY/SKIN: NEGATIVE for worrisome rashes, moles or lesions  EYES: NEGATIVE for vision changes or irritation  ENT/MOUTH: NEGATIVE for ear, mouth and throat problems  RESP: NEGATIVE for significant cough or SOB  BREAST: NEGATIVE for masses, tenderness or discharge  CV: NEGATIVE for chest pain, palpitations or peripheral edema  GI: NEGATIVE for nausea, abdominal pain, heartburn, or change in bowel habits  : NEGATIVE for frequency, dysuria, or hematuria  MUSCULOSKELETAL: NEGATIVE for significant arthralgias or myalgia  NEURO: NEGATIVE for weakness, dizziness or paresthesias  ENDOCRINE: NEGATIVE for temperature intolerance, skin/hair changes  HEME: NEGATIVE for bleeding problems  PSYCHIATRIC: NEGATIVE for changes in mood or affect     Objective    Exam  /80   Pulse 71   Temp 97.7  F (36.5  C) (Temporal)   Resp 20   Ht 1.753 m (5' 9\")   Wt 72.2 kg (159 lb 3.2 oz)   SpO2 99%   BMI 23.51 kg/m     Estimated body mass index is 23.51 kg/m  as calculated from the following:    Height as of this encounter: 1.753 m (5' 9\").    Weight as of this encounter: 72.2 kg (159 lb 3.2 oz).    Physical Exam  GENERAL: alert and no distress  EYES: Eyes grossly normal to inspection, PERRL and conjunctivae and sclerae normal  HENT: ear canals and TM's normal, nose and mouth without ulcers or lesions  NECK: no adenopathy, no asymmetry, masses, " or scars  RESP: lungs clear to auscultation - no rales, rhonchi or wheezes  CV: regular rate and rhythm, normal S1 S2, no S3 or S4, no murmur, click or rub, no peripheral edema  ABDOMEN: soft, nontender, no hepatosplenomegaly, no masses and bowel sounds normal   (male): Deferred per patient no concerns  MS: no gross musculoskeletal defects noted, no edema, no CVA tenderness  SKIN: no suspicious lesions or rashes  NEURO: Normal strength and tone, cranial nerves intact, mentation intact and speech normal  PSYCH: mentation appears normal, affect normal/bright  LYMPH: no cervical, supraclavicular  adenopathy        Signed Electronically by: GURPREET RUCKER

## 2025-01-07 NOTE — PATIENT INSTRUCTIONS
Patient Education   Preventive Care Advice   This is general advice given by our system to help you stay healthy. However, your care team may have specific advice just for you. Please talk to your care team about your preventive care needs.  Nutrition  Eat 5 or more servings of fruits and vegetables each day.  Try wheat bread, brown rice and whole grain pasta (instead of white bread, rice, and pasta).  Get enough calcium and vitamin D. Check the label on foods and aim for 100% of the RDA (recommended daily allowance).  Lifestyle  Exercise at least 150 minutes each week  (30 minutes a day, 5 days a week).  Do muscle strengthening activities 2 days a week. These help control your weight and prevent disease.  No smoking.  Wear sunscreen to prevent skin cancer.  Have a dental exam and cleaning every 6 months.  Yearly exams  See your health care team every year to talk about:  Any changes in your health.  Any medicines your care team has prescribed.  Preventive care, family planning, and ways to prevent chronic diseases.  Shots (vaccines)   HPV shots (up to age 26), if you've never had them before.  Hepatitis B shots (up to age 59), if you've never had them before.  COVID-19 shot: Get this shot when it's due.  Flu shot: Get a flu shot every year.  Tetanus shot: Get a tetanus shot every 10 years.  Pneumococcal, hepatitis A, and RSV shots: Ask your care team if you need these based on your risk.  Shingles shot (for age 50 and up)  General health tests  Diabetes screening:  Starting at age 35, Get screened for diabetes at least every 3 years.  If you are younger than age 35, ask your care team if you should be screened for diabetes.  Cholesterol test: At age 39, start having a cholesterol test every 5 years, or more often if advised.  Bone density scan (DEXA): At age 50, ask your care team if you should have this scan for osteoporosis (brittle bones).  Hepatitis C: Get tested at least once in your life.  STIs (sexually  transmitted infections)  Before age 24: Ask your care team if you should be screened for STIs.  After age 24: Get screened for STIs if you're at risk. You are at risk for STIs (including HIV) if:  You are sexually active with more than one person.  You don't use condoms every time.  You or a partner was diagnosed with a sexually transmitted infection.  If you are at risk for HIV, ask about PrEP medicine to prevent HIV.  Get tested for HIV at least once in your life, whether you are at risk for HIV or not.  Cancer screening tests  Cervical cancer screening: If you have a cervix, begin getting regular cervical cancer screening tests starting at age 21.  Breast cancer scan (mammogram): If you've ever had breasts, begin having regular mammograms starting at age 40. This is a scan to check for breast cancer.  Colon cancer screening: It is important to start screening for colon cancer at age 45.  Have a colonoscopy test every 10 years (or more often if you're at risk) Or, ask your provider about stool tests like a FIT test every year or Cologuard test every 3 years.  To learn more about your testing options, visit:   .  For help making a decision, visit:   https://bit.ly/vy92545.  Prostate cancer screening test: If you have a prostate, ask your care team if a prostate cancer screening test (PSA) at age 55 is right for you.  Lung cancer screening: If you are a current or former smoker ages 50 to 80, ask your care team if ongoing lung cancer screenings are right for you.  For informational purposes only. Not to replace the advice of your health care provider. Copyright   2023 [employer]. All rights reserved. Clinically reviewed by the [facility]. Mpayy [id number] - REV 01/24.

## 2025-01-07 NOTE — H&P (VIEW-ONLY)
Preventive Care Visit  [facility] GURPREET GUERRA, Family Medicine  Jan 7, 2025      Assessment & Plan     Routine general medical examination at a health care facility      Screening for diabetes mellitus    - Glucose; Future  - Glucose    Screening for prostate cancer    - PSA, screen; Future  - PSA, screen    Screening for thyroid disorder    - TSH with free T4 reflex; Future  - TSH with free T4 reflex    Special screening for malignant neoplasms, colon    - Colonoscopy Screening  Referral; Future    Positive FIT (fecal immunochemical test)      Family history of colon cancer    - Colonoscopy Screening  Referral; Future    Positive colorectal cancer screening using Cologuard test    - Colonoscopy Screening  Referral; Future    Screening cholesterol level    - Lipid panel reflex to direct LDL Non-fasting; Future  - Lipid panel reflex to direct LDL Non-fasting    Screening for HIV (human immunodeficiency virus)    - HIV Antigen Antibody Combo; Future  - HIV Antigen Antibody Combo    Need for hepatitis C screening test    - Hepatitis C Screen Reflex to HCV RNA Quant and Genotype; Future  - Hepatitis C Screen Reflex to HCV RNA Quant and Genotype    Ketogenic diet      Patient has been advised of split billing requirements and indicates understanding: Yes        Counseling  Appropriate preventive services were addressed with this patient via screening, questionnaire, or discussion as appropriate for fall prevention, nutrition, physical activity, Tobacco-use cessation, social engagement, weight loss and cognition.  Checklist reviewing preventive services available has been given to the patient.  Reviewed patient's diet, addressing concerns and/or questions.       Regular exercise  See Patient Instructions    Sondra   Lamonte is a 54 year old, presenting for the following:  Physical    He reports his mental health is good.  He is retired.  He lost  significant weight with ketogenic diet and walking and no longer needs blood pressure or cholesterol medication.  2 adult children, gets along well with wife.  Quit smoking over 20 years ago.  In agreement to screening labs.  Declining vaccines.  Would like colonoscopy ordered.  Did have positive Cologuard last year, family history of colon cancer.        1/7/2025    10:07 AM   Additional Questions   Roomed by CHUNG         1/7/2025    10:07 AM   Patient Reported Additional Medications   Patient reports taking the following new medications Medications missing from list-provider please review and add          HPI    Patient would still like to discuss the following concern(s):  1. Colonoscopy order            1/2/2025   General Health   How would you rate your overall physical health? Good   Feel stress (tense, anxious, or unable to sleep) Only a little   (!) STRESS CONCERN      1/2/2025   Nutrition   Three or more servings of calcium each day? (!) I DON'T KNOW   Diet: Carbohydrate counting    Other   If other, please elaborate: Low Sugar diet that includes Healthy fats and Protein.  Regular Intermittent fasting.   How many servings of fruit and vegetables per day? (!) 2-3   How many sweetened beverages each day? 0-1       Multiple values from one day are sorted in reverse-chronological order         1/2/2025   Exercise   Days per week of moderate/strenous exercise 6 days   Average minutes spent exercising at this level 60 min         1/2/2025   Social Factors   Frequency of gathering with friends or relatives Patient declined   Worry food won't last until get money to buy more No   Food not last or not have enough money for food? No   Do you have housing? (Housing is defined as stable permanent housing and does not include staying ouside in a car, in a tent, in an abandoned building, in an overnight shelter, or couch-surfing.) Yes   Are you worried about losing your housing? No   Lack of transportation? No   Unable to  get utilities (heat,electricity)? No         2025   Fall Risk   Fallen 2 or more times in the past year? No   Trouble with walking or balance? No          2025   Dental   Dentist two times every year? Yes         2025   TB Screening   Were you born outside of the [place]? No         Today's PHQ-2 Score:       2025    12:52 PM   PHQ-2 (  [employer])   Q1: Little interest or pleasure in doing things 0   Q2: Feeling down, depressed or hopeless 0   PHQ-2 Score 0    Q1: Little interest or pleasure in doing things Not at all   Q2: Feeling down, depressed or hopeless Not at all   PHQ-2 Score 0       Patient-reported           2025   Substance Use   Alcohol more than 3/day or more than 7/wk Not Applicable   Do you use any other substances recreationally? No     Social History     Tobacco Use    Smoking status: Former     Current packs/day: 0.00     Types: Cigarettes     Start date: 1984     Quit date: 1986     Years since quittin.6     Passive exposure: Past    Smokeless tobacco: Never    Tobacco comments:     Light snuff chewer when young   Vaping Use    Vaping status: Never Used   Substance Use Topics    Alcohol use: Not Currently     Alcohol/week: 0.0 - 1.0 standard drinks of alcohol     Comment: on occassion    Drug use: No             2025   One time HIV Screening   Previous HIV test? No         2025   STI Screening   New sexual partner(s) since last STI/HIV test? No   Last PSA:   PSA   Date Value Ref Range Status   2016 1.02 0 - 4 ug/L Final     ASCVD Risk   The ASCVD Risk score ([place]rest[place] BILLS, et al., 2019) failed to calculate for the following reasons:    Cannot find a previous HDL lab    Cannot find a previous total cholesterol lab           Reviewed and updated as needed this visit by Provider   Tobacco  Allergies  Meds  Problems  Med Hx  Surg Hx  Fam Hx  Soc   Hx Sexual Activity          Past Medical History:   Diagnosis Date    Myocardial infarction (H)      with occluded LAD lesion     Past Surgical History:   Procedure Laterality Date    CARDIAC SURGERY  2007    Stents    SURGICAL HISTORY OF -   1980    Dental surgery - no complications on tooth extraction x3    SURGICAL HISTORY OF -   2004    RIGHT achilles tendon repair     Lab work is in process  Labs reviewed in EPIC  BP Readings from Last 3 Encounters:   25 122/80   17 119/79   16 134/79    Wt Readings from Last 3 Encounters:   25 72.2 kg (159 lb 3.2 oz)   17 102.5 kg (226 lb)   16 96.6 kg (213 lb)                  Patient Active Problem List   Diagnosis    Obstructive sleep apnea- severe (AHI 31)    CAD (coronary artery disease)    Family History of Colon Cancer father at 55    ADD (attention deficit disorder)    Positive colorectal cancer screening using Cologuard test    Ketogenic diet     Past Surgical History:   Procedure Laterality Date    CARDIAC SURGERY  2007    Stents    SURGICAL HISTORY OF -   1980    Dental surgery - no complications on tooth extraction x3    SURGICAL HISTORY OF -   2004    RIGHT achilles tendon repair       Social History     Tobacco Use    Smoking status: Former     Current packs/day: 0.00     Types: Cigarettes     Start date: 1984     Quit date: 1986     Years since quittin.6     Passive exposure: Past    Smokeless tobacco: Never    Tobacco comments:     Light snuff chewer when young   Substance Use Topics    Alcohol use: Not Currently     Alcohol/week: 0.0 - 1.0 standard drinks of alcohol     Comment: on occassion     Family History   Problem Relation Age of Onset    Heart Disease Other     Heart Disease Other     Cancer Father         unknown type    Cancer - colorectal Father 55    Breast Cancer Mother         age of onset:  65    Cancer Mother         lung cancer    Respiratory Mother         asthma    Diabetes Maternal Grandmother     Diabetes Maternal Grandfather     Diabetes Paternal  "Grandfather     Cerebrovascular Disease Paternal Grandfather     Asthma No family hx of     Hypertension No family hx of     Prostate Cancer No family hx of          Current Outpatient Medications   Medication Sig Dispense Refill    Coenzyme Q10 (CO Q 10) 100 MG CAPS Take 200 mg by mouth      FISH OIL       order for DME CPAP 10 cm       No Known Allergies  Recent Labs   Lab Test 05/17/17  1024   LDL 56   HDL 31*   TRIG 195*   ALT 47   CR 1.13   GFRESTIMATED 69   GFRESTBLACK 83   POTASSIUM 3.8          Review of Systems  CONSTITUTIONAL: NEGATIVE for fever, chills, change in weight  INTEGUMENTARY/SKIN: NEGATIVE for worrisome rashes, moles or lesions  EYES: NEGATIVE for vision changes or irritation  ENT/MOUTH: NEGATIVE for ear, mouth and throat problems  RESP: NEGATIVE for significant cough or SOB  BREAST: NEGATIVE for masses, tenderness or discharge  CV: NEGATIVE for chest pain, palpitations or peripheral edema  GI: NEGATIVE for nausea, abdominal pain, heartburn, or change in bowel habits  : NEGATIVE for frequency, dysuria, or hematuria  MUSCULOSKELETAL: NEGATIVE for significant arthralgias or myalgia  NEURO: NEGATIVE for weakness, dizziness or paresthesias  ENDOCRINE: NEGATIVE for temperature intolerance, skin/hair changes  HEME: NEGATIVE for bleeding problems  PSYCHIATRIC: NEGATIVE for changes in mood or affect     Objective    Exam  /80   Pulse 71   Temp 97.7  F (36.5  C) (Temporal)   Resp 20   Ht 1.753 m (5' 9\")   Wt 72.2 kg (159 lb 3.2 oz)   SpO2 99%   BMI 23.51 kg/m     Estimated body mass index is 23.51 kg/m  as calculated from the following:    Height as of this encounter: 1.753 m (5' 9\").    Weight as of this encounter: 72.2 kg (159 lb 3.2 oz).    Physical Exam  GENERAL: alert and no distress  EYES: Eyes grossly normal to inspection, PERRL and conjunctivae and sclerae normal  HENT: ear canals and TM's normal, nose and mouth without ulcers or lesions  NECK: no adenopathy, no asymmetry, masses, " or scars  RESP: lungs clear to auscultation - no rales, rhonchi or wheezes  CV: regular rate and rhythm, normal S1 S2, no S3 or S4, no murmur, click or rub, no peripheral edema  ABDOMEN: soft, nontender, no hepatosplenomegaly, no masses and bowel sounds normal   (male): Deferred per patient no concerns  MS: no gross musculoskeletal defects noted, no edema, no CVA tenderness  SKIN: no suspicious lesions or rashes  NEURO: Normal strength and tone, cranial nerves intact, mentation intact and speech normal  PSYCH: mentation appears normal, affect normal/bright  LYMPH: no cervical, supraclavicular  adenopathy        Signed Electronically by: GURPREET RUCKER

## 2025-01-08 ENCOUNTER — TELEPHONE (OUTPATIENT)
Dept: GASTROENTEROLOGY | Facility: CLINIC | Age: 55
End: 2025-01-08
Payer: COMMERCIAL

## 2025-01-08 LAB
CHOLEST SERPL-MCNC: 319 MG/DL
FASTING STATUS PATIENT QL REPORTED: NO
FASTING STATUS PATIENT QL REPORTED: NO
GLUCOSE SERPL-MCNC: 87 MG/DL (ref 70–99)
HCV AB SERPL QL IA: NONREACTIVE
HDLC SERPL-MCNC: 54 MG/DL
HIV 1+2 AB+HIV1 P24 AG SERPL QL IA: NONREACTIVE
LDLC SERPL CALC-MCNC: 237 MG/DL
NONHDLC SERPL-MCNC: 265 MG/DL
PSA SERPL DL<=0.01 NG/ML-MCNC: 1 NG/ML (ref 0–3.5)
TRIGL SERPL-MCNC: 139 MG/DL
TSH SERPL DL<=0.005 MIU/L-ACNC: 1.79 UIU/ML (ref 0.3–4.2)

## 2025-01-08 NOTE — RESULT ENCOUNTER NOTE
Gurdeep Aburto,    Thank you for your recent office visit.    Here are your recent results.  Normal glucose level you are not diabetic.  For nondiabetic your cholesterol is probably okay since you were not fasting.  If you were fasting we need to recheck your cholesterol.  Normal thyroid level.  Normal prostate level.  Normal HIV and hepatitis C screening done once for an adult in the [place] as a national standard test    Feel free to contact me via [employer] or call the clinic at [phone].    Sincerely,    ROSETTA Morgan, FNP-BC

## 2025-01-08 NOTE — TELEPHONE ENCOUNTER
Endoscopy Scheduling Screen      What insurance is in the chart?  Other:  [employer]    Ordering/Referring Provider: Carly Ayers   (If ordering provider performs procedure, schedule with ordering provider unless otherwise instructed. )    BMI: There is no height or weight on file to calculate BMI.  23.51    Sedation Ordered  general anesthesia.   BMI<= 45 45 < BMI <= 48 48 < BMI < = 50  BMI > 50   No Restrictions No [facility]  No [facility]  [facility] with exceptions Hospital Only OR Only       Do you have a history of malignant hyperthermia?  NO    (Females) Are you currently pregnant?   NO     Are you currently on dialysis?   NO    Do you need assistance transferring?   NO    BMI: There is no height or weight on file to calculate BMI.     Is patients BMI > 50?  NO    BMI > 40?  NO    Do you have a diagnosis of diabetes?  NO    Do you take an injectable medication for weight loss or diabetes (excluding insulin)?  NO    Do you take the medication Naltrexone?  NO    Do you take blood thinners?  NO    Prep   Are you currently on dialysis or do you have chronic kidney disease?  NO    Do you have a diagnosis of cystic fibrosis (CF)?  NO    On a regular basis do you go 3 -5 days between bowel movements?  NO    Preferred Pharmacy:    [id number] [address]  [address] [place] [address]  Phone: [phone] Fax: [phone]      Final Scheduling Details     Procedure scheduled  Colonoscopy    Surgeon:  Saud     Date of procedure:  1-28-25     Location  [place] - Patient preference.    What is your communication preference for Instructions and/or Bowel Prep?   MyChart    Patient Reminders:    You will receive a call from a Nurse to review instructions and health history.  This assessment must be completed prior to your procedure.  Failure to complete the Nurse assessment may result in the procedure being cancelled.       On  the day of your procedure, please designate an adult(s) who can drive you home stay with you for the next 24 hours. The medicines used in the exam will make you sleepy. You will not be able to drive.       You cannot take public transportation, ride share services, or non-medical taxi service without a responsible caregiver.  Medical transport services are allowed with the requirement that a responsible caregiver will receive you at your destination.  We require that drivers and caregivers are confirmed prior to your procedure.

## 2025-01-21 ENCOUNTER — ANESTHESIA EVENT (OUTPATIENT)
Dept: GASTROENTEROLOGY | Facility: CLINIC | Age: 55
End: 2025-01-21
Payer: COMMERCIAL

## 2025-01-21 ASSESSMENT — LIFESTYLE VARIABLES: TOBACCO_USE: 1

## 2025-01-21 NOTE — ANESTHESIA PREPROCEDURE EVALUATION
Anesthesia Pre-Procedure Evaluation    Patient: Lamonte Sarkar   MRN: [id number] : 1970        Procedure : Procedure(s):  Colonoscopy          Past Medical History:   Diagnosis Date    Myocardial infarction (H)     with occluded LAD lesion      Past Surgical History:   Procedure Laterality Date    CARDIAC SURGERY  2007    Stents    SURGICAL HISTORY OF -   1980    Dental surgery - no complications on tooth extraction x3    SURGICAL HISTORY OF -   2004    RIGHT achilles tendon repair      No Known Allergies   Social History     Tobacco Use    Smoking status: Former     Current packs/day: 0.00     Types: Cigarettes     Start date: 1984     Quit date: 1986     Years since quittin.7     Passive exposure: Past    Smokeless tobacco: Never    Tobacco comments:     Light snuff chewer when young   Substance Use Topics    Alcohol use: Not Currently     Alcohol/week: 0.0 - 1.0 standard drinks of alcohol     Comment: on occassion      Wt Readings from Last 1 Encounters:   25 72.2 kg (159 lb 3.2 oz)        Anesthesia Evaluation   Pt has had prior anesthetic.         ROS/MED HX  ENT/Pulmonary:     (+) sleep apnea,    JACQUELINE risk factors,           tobacco use, Past use,                       Neurologic:       Cardiovascular:     (+)  - -  CAD - past MI - -                                      METS/Exercise Tolerance:     Hematologic:       Musculoskeletal:       GI/Hepatic:       Renal/Genitourinary:       Endo:       Psychiatric/Substance Use:       Infectious Disease:       Malignancy:       Other:            Physical Exam    Airway        Mallampati: I   TM distance: > 3 FB   Neck ROM: full   Mouth opening: > 3 cm    Respiratory Devices and Support         Dental       (+) Minor Abnormalities - some fillings, tiny chips      Cardiovascular   cardiovascular exam normal       Rhythm and rate: regular and normal     Pulmonary   pulmonary exam normal        breath sounds clear to  "auscultation           OUTSIDE LABS:  CBC: No results found for: \"WBC\", \"HGB\", \"HCT\", \"PLT\"  BMP:   Lab Results   Component Value Date     05/17/2017     05/09/2016    POTASSIUM 3.8 05/17/2017    POTASSIUM 4.0 05/09/2016    CHLORIDE 105 05/17/2017    CHLORIDE 106 05/09/2016    CO2 28 05/17/2017    CO2 29 05/09/2016    BUN 12 05/17/2017    BUN 13 05/09/2016    CR 1.13 05/17/2017    CR 0.95 05/09/2016    GLC 87 01/07/2025    GLC 94 05/17/2017     COAGS: No results found for: \"PTT\", \"INR\", \"FIBR\"  POC: No results found for: \"BGM\", \"HCG\", \"HCGS\"  HEPATIC:   Lab Results   Component Value Date    ALBUMIN 5.0 01/14/2011    PROTTOTAL 7.8 01/14/2011    ALT 47 05/17/2017    AST 32 01/11/2012    ALKPHOS 45 01/14/2011    BILITOTAL 2.7 (H) 01/14/2011     OTHER:   Lab Results   Component Value Date    A1C 5.4 07/08/2013    RICK 8.9 05/17/2017    TSH 1.79 01/07/2025       Anesthesia Plan    ASA Status:  3    NPO Status:  NPO Appropriate    Anesthesia Type: General.     - Airway: Native airway   Induction: Intravenous, Propofol.   Maintenance: TIVA.        Consents    Anesthesia Plan(s) and associated risks, benefits, and realistic alternatives discussed. Questions answered and patient/representative(s) expressed understanding.     - Discussed: Risks, Benefits and Alternatives for BOTH SEDATION and the PROCEDURE were discussed     - Discussed with:  Patient            Postoperative Care       PONV prophylaxis: Background Propofol Infusion     Comments:               Bharathi Martinez APRN CRNA    I have reviewed the pertinent notes and labs in the chart from the past 30 days and (re)examined the patient.  Any updates or changes from those notes are reflected in this note.    Clinically Significant Risk Factors Present on Admission                                          "

## 2025-01-28 ENCOUNTER — ANESTHESIA (OUTPATIENT)
Dept: GASTROENTEROLOGY | Facility: CLINIC | Age: 55
End: 2025-01-28
Payer: COMMERCIAL

## 2025-01-28 ENCOUNTER — HOSPITAL ENCOUNTER (OUTPATIENT)
Facility: CLINIC | Age: 55
Discharge: HOME | End: 2025-01-28
Attending: STUDENT IN AN ORGANIZED HEALTH CARE EDUCATION/TRAINING PROGRAM | Admitting: STUDENT IN AN ORGANIZED HEALTH CARE EDUCATION/TRAINING PROGRAM
Payer: COMMERCIAL

## 2025-01-28 VITALS
DIASTOLIC BLOOD PRESSURE: 74 MMHG | OXYGEN SATURATION: 96 % | HEART RATE: 59 BPM | RESPIRATION RATE: 14 BRPM | TEMPERATURE: 98 F | SYSTOLIC BLOOD PRESSURE: 100 MMHG

## 2025-01-28 LAB — COLONOSCOPY: NORMAL

## 2025-01-28 PROCEDURE — G0121 COLON CA SCRN NOT HI RSK IND: HCPCS | Performed by: STUDENT IN AN ORGANIZED HEALTH CARE EDUCATION/TRAINING PROGRAM

## 2025-01-28 PROCEDURE — 250N000011 HC RX IP 250 OP 636

## 2025-01-28 PROCEDURE — 45378 DIAGNOSTIC COLONOSCOPY: CPT | Performed by: STUDENT IN AN ORGANIZED HEALTH CARE EDUCATION/TRAINING PROGRAM

## 2025-01-28 PROCEDURE — 250N000009 HC RX 250

## 2025-01-28 PROCEDURE — 370N000017 HC ANESTHESIA TECHNICAL FEE, PER MIN: Performed by: STUDENT IN AN ORGANIZED HEALTH CARE EDUCATION/TRAINING PROGRAM

## 2025-01-28 RX ORDER — LIDOCAINE 40 MG/G
CREAM TOPICAL
Status: DISCONTINUED | OUTPATIENT
Start: 2025-01-28 | End: 2025-01-28 | Stop reason: HOSPADM

## 2025-01-28 RX ORDER — LIDOCAINE HYDROCHLORIDE 20 MG/ML
INJECTION, SOLUTION INFILTRATION; PERINEURAL PRN
Status: DISCONTINUED | OUTPATIENT
Start: 2025-01-28 | End: 2025-01-28

## 2025-01-28 RX ORDER — PROPOFOL 10 MG/ML
INJECTION, EMULSION INTRAVENOUS CONTINUOUS PRN
Status: DISCONTINUED | OUTPATIENT
Start: 2025-01-28 | End: 2025-01-28

## 2025-01-28 RX ORDER — GLYCOPYRROLATE 0.2 MG/ML
INJECTION, SOLUTION INTRAMUSCULAR; INTRAVENOUS PRN
Status: DISCONTINUED | OUTPATIENT
Start: 2025-01-28 | End: 2025-01-28

## 2025-01-28 RX ORDER — SODIUM CHLORIDE, SODIUM LACTATE, POTASSIUM CHLORIDE, CALCIUM CHLORIDE 600; 310; 30; 20 MG/100ML; MG/100ML; MG/100ML; MG/100ML
INJECTION, SOLUTION INTRAVENOUS CONTINUOUS PRN
Status: DISCONTINUED | OUTPATIENT
Start: 2025-01-28 | End: 2025-01-28

## 2025-01-28 RX ORDER — PROPOFOL 10 MG/ML
INJECTION, EMULSION INTRAVENOUS PRN
Status: DISCONTINUED | OUTPATIENT
Start: 2025-01-28 | End: 2025-01-28

## 2025-01-28 RX ADMIN — LIDOCAINE HYDROCHLORIDE 100 MG: 20 INJECTION, SOLUTION INFILTRATION; PERINEURAL at 11:39

## 2025-01-28 RX ADMIN — PROPOFOL 200 MCG/KG/MIN: 10 INJECTION, EMULSION INTRAVENOUS at 11:40

## 2025-01-28 RX ADMIN — PROPOFOL 50 MG: 10 INJECTION, EMULSION INTRAVENOUS at 11:40

## 2025-01-28 RX ADMIN — GLYCOPYRROLATE 0.1 MG: 0.2 INJECTION, SOLUTION INTRAMUSCULAR; INTRAVENOUS at 11:39

## 2025-01-28 ASSESSMENT — ACTIVITIES OF DAILY LIVING (ADL)
ADLS_ACUITY_SCORE: 41

## 2025-01-28 NOTE — ANESTHESIA CARE TRANSFER NOTE
Patient: Lamonte Sarkar    Procedure: Procedure(s):  Colonoscopy       Diagnosis: Special screening for malignant neoplasm of colon [Z12.11]  Family history of colon cancer [Z80.0]  Positive colorectal cancer screening using Cologuard test [R19.5]  Diagnosis Additional Information: No value filed.    Anesthesia Type:   General     Note:    Oropharynx: oropharynx clear of all foreign objects and spontaneously breathing  Level of Consciousness: drowsy  Oxygen Supplementation: room air    Independent Airway: airway patency satisfactory and stable  Dentition: dentition unchanged  Vital Signs Stable: post-procedure vital signs reviewed and stable  Report to RN Given: handoff report given  Patient transferred to: Phase II    Handoff Report: Identifed the Patient, Identified the Reponsible Provider, Reviewed the pertinent medical history, Discussed the surgical course, Reviewed Intra-OP anesthesia mangement and issues during anesthesia, Set expectations for post-procedure period and Allowed opportunity for questions and acknowledgement of understanding      Vitals:  Vitals Value Taken Time   /67 01/28/25 1215   Temp 36.7  C (98  F) 01/28/25 1215   Pulse 61 01/28/25 1215   Resp 12 01/28/25 1215   SpO2 93 % 01/28/25 1219   Vitals shown include unfiled device data.    Electronically Signed By: ROSETTA Pitts CRNA  January 28, 2025  12:20 PM

## 2025-01-28 NOTE — ADDENDUM NOTE
Addendum  created 01/28/25 1404 by Neftali Slater APRN CRNA    Review and Sign - Ready for Procedure

## 2025-01-28 NOTE — ANESTHESIA POSTPROCEDURE EVALUATION
Patient: Lamonte Sarkar    Procedure: Procedure(s):  Colonoscopy       Anesthesia Type:  General    Note:  Disposition: Outpatient   Postop Pain Control: Uneventful            Sign Out: Well controlled pain   PONV: No   Neuro/Psych: Uneventful            Sign Out: Acceptable/Baseline neuro status   Airway/Respiratory: Uneventful            Sign Out: Acceptable/Baseline resp. status   CV/Hemodynamics: Uneventful            Sign Out: Acceptable CV status; No obvious hypovolemia; No obvious fluid overload   Other NRE: NONE   DID A NON-ROUTINE EVENT OCCUR? No           Last vitals:  Vitals Value Taken Time   /67 01/28/25 1215   Temp 36.7  C (98  F) 01/28/25 1215   Pulse 61 01/28/25 1215   Resp 12 01/28/25 1215   SpO2 96 % 01/28/25 1225   Vitals shown include unfiled device data.    Electronically Signed By: ROSETTA Pitts CRNA  January 28, 2025  12:26 PM